# Patient Record
Sex: MALE | Race: ASIAN | NOT HISPANIC OR LATINO
[De-identification: names, ages, dates, MRNs, and addresses within clinical notes are randomized per-mention and may not be internally consistent; named-entity substitution may affect disease eponyms.]

---

## 2024-05-22 ENCOUNTER — APPOINTMENT (OUTPATIENT)
Dept: ORTHOPEDIC SURGERY | Facility: CLINIC | Age: 24
End: 2024-05-22
Payer: COMMERCIAL

## 2024-05-22 VITALS
DIASTOLIC BLOOD PRESSURE: 82 MMHG | WEIGHT: 144 LBS | HEIGHT: 67 IN | HEART RATE: 71 BPM | BODY MASS INDEX: 22.6 KG/M2 | SYSTOLIC BLOOD PRESSURE: 116 MMHG

## 2024-05-22 DIAGNOSIS — Z78.9 OTHER SPECIFIED HEALTH STATUS: ICD-10-CM

## 2024-05-22 DIAGNOSIS — G89.29 PAIN IN LEFT HIP: ICD-10-CM

## 2024-05-22 DIAGNOSIS — M25.552 PAIN IN LEFT HIP: ICD-10-CM

## 2024-05-22 DIAGNOSIS — M16.12 UNILATERAL PRIMARY OSTEOARTHRITIS, LEFT HIP: ICD-10-CM

## 2024-05-22 PROBLEM — Z00.00 ENCOUNTER FOR PREVENTIVE HEALTH EXAMINATION: Status: ACTIVE | Noted: 2024-05-22

## 2024-05-22 PROCEDURE — 99204 OFFICE O/P NEW MOD 45 MIN: CPT

## 2024-05-22 PROCEDURE — 73502 X-RAY EXAM HIP UNI 2-3 VIEWS: CPT | Mod: LT

## 2024-05-22 NOTE — HISTORY OF PRESENT ILLNESS
[de-identified] : VIK VEE  is a 23 year old M who presents with left hip pain.  He says that this has been going on for about 5 years.  He denies any injury or incident to cause it.  He localizes the pain to the anterior hip area and says that he feels it when he walks and when he tries to bend his hip.  He says the pain can radiate down to the knee.  He did see another orthopedic surgeon about 10 months ago in [place] and had a hip injection at that time and did try physical therapy as well.  He says that the injection helped and his hip was doing well until earlier this month when he started to feel the pain again.  He denies any significant medical issues and denies any family history of arthritis.

## 2024-05-22 NOTE — DISCUSSION/SUMMARY
[de-identified] : Discussed findings of today's exam and possible causes of patient's pain.  Educated patient on their diagnosis of bilateral hip osteoarthritis, left worse than right. We discussed that arthritis is a thinning of cartilage and this leads to rough surfaces which over time can lead to pain and inflammation of the hip as it does not move as smoothly as it once did.  Discussed that hip arthritis is typically treated based on the patient's symptoms.  If He is having significant pain, we first start with nonoperative modalities such as anti-inflammatories and physical therapy.  Also discussed injections, but also discussed that there is a risk of worsening of arthritis with hip injections.  Once these have been exhausted, we can consider surgical management, which for most people includes a total hip replacement.  Given his young age and since the injection did work well for him the last time, decision was made to not pursue surgical management at this time.  Reviewed possible courses of nonoperative treatment, and we collaboratively decided best course of treatment at this time will include physical therapy, which will help strengthen the lower leg and take pressure off of the hip.  He was also given a prescription for an imaging guided injection into the left hip.  Because of his young age, I also discussed the possibility of an inflammatory arthritis.  I recommend that he obtain a left hip MRI to evaluate his hip and his SI joint further to evaluate for a secondary cause of arthritis.  In addition, I recommend that he see a rheumatologist for further evaluation of potential inflammatory arthritis.  He will follow-up once the MRI is complete.  I have personally obtained the history, reviewed the ROS as noted, and performed the physical examination today. The patient and I discussed the assessment and options and developed the plan. All questions were answered and the patient stated their understanding of the treatment plan and appreciation of the visit.  My cumulative time spent on this patient's visit included: Preparation for the visit, review of the medical records, review of pertinent diagnostic studies, examination and counseling of the patient on the above diagnosis, treatment plan and prognosis, orders of diagnostic tests, medications and/or appropriate procedures and documentation in the medical records of today's visit.  This note was generated using dragon medical dictation software.  A reasonable effort has been made for proofreading its contents, but typos may still remain.  If there are any questions or points of clarification needed please notify my office.

## 2024-05-22 NOTE — PHYSICAL EXAM
[de-identified] : General: No apparent distress Cardiovascular: extremities warm and well-perfused, no cyanosis Pulmonary: non labored respirations  Musculoskeletal:  Lumbar Spine: Flexion/Extension: Able to flex forward and extend back without significant pain Straight Leg Raise: Negative LE Motor Exam: 5/5 hip flexors/quad/EHL/TA/GS Sensory: SILT throughout all distributions  Left hip:  Gait: Mild Trendelenburg gait Tenderness: No tenderness to palpation about greater trochanter Adductor strength: 5 Abductor strength: 5 Hip Flexion: 70 IR at 90: 0 ER at 90: 10 FADIR: Positive JAKOB: Negative Subspine impingement: Positive  Right hip:  Gait: Negative Tenderness: None Adductor strength: 5 Abductor strength: 5 Hip Flexion: 90 IR at 90: 5 ER at 90: 20 FADIR: Negative JAKOB: Negative Subspine impingement: Negative [de-identified] : 2 views of the left hip obtained today and interpreted by me including AP and lateral views demonstrate no acute fracture or dislocation.  There are moderate to severe degenerative changes about the hip with joint space narrowing and osteophyte formation.  There are also mild degenerative changes about the right hip.  There is possible sclerosis of the SI joints bilaterally  Images reviewed in detail with the patient

## 2024-06-13 ENCOUNTER — APPOINTMENT (OUTPATIENT)
Dept: MRI IMAGING | Facility: CLINIC | Age: 24
End: 2024-06-13
Payer: COMMERCIAL

## 2024-06-13 ENCOUNTER — OUTPATIENT (OUTPATIENT)
Dept: OUTPATIENT SERVICES | Facility: HOSPITAL | Age: 24
LOS: 1 days | End: 2024-06-13
Payer: COMMERCIAL

## 2024-06-13 DIAGNOSIS — M16.12 UNILATERAL PRIMARY OSTEOARTHRITIS, LEFT HIP: ICD-10-CM

## 2024-06-13 PROCEDURE — 73721 MRI JNT OF LWR EXTRE W/O DYE: CPT | Mod: 26,LT

## 2024-06-13 PROCEDURE — 73721 MRI JNT OF LWR EXTRE W/O DYE: CPT

## 2024-06-14 ENCOUNTER — APPOINTMENT (OUTPATIENT)
Dept: RADIOLOGY | Facility: CLINIC | Age: 24
End: 2024-06-14
Payer: COMMERCIAL

## 2024-06-14 ENCOUNTER — OUTPATIENT (OUTPATIENT)
Dept: OUTPATIENT SERVICES | Facility: HOSPITAL | Age: 24
LOS: 1 days | End: 2024-06-14
Payer: COMMERCIAL

## 2024-06-14 DIAGNOSIS — M83.9 ADULT OSTEOMALACIA, UNSPECIFIED: ICD-10-CM

## 2024-06-14 DIAGNOSIS — M79.10 MYALGIA, UNSPECIFIED SITE: ICD-10-CM

## 2024-06-14 DIAGNOSIS — M15.0 PRIMARY GENERALIZED (OSTEO)ARTHRITIS: ICD-10-CM

## 2024-06-14 DIAGNOSIS — M06.4 INFLAMMATORY POLYARTHROPATHY: ICD-10-CM

## 2024-06-14 DIAGNOSIS — M25.50 PAIN IN UNSPECIFIED JOINT: ICD-10-CM

## 2024-06-14 PROCEDURE — 72040 X-RAY EXAM NECK SPINE 2-3 VW: CPT | Mod: 26

## 2024-06-14 PROCEDURE — 72202 X-RAY EXAM SI JOINTS 3/> VWS: CPT | Mod: 26

## 2024-06-14 PROCEDURE — 72070 X-RAY EXAM THORAC SPINE 2VWS: CPT

## 2024-06-14 PROCEDURE — 72100 X-RAY EXAM L-S SPINE 2/3 VWS: CPT | Mod: 26

## 2024-06-14 PROCEDURE — 72040 X-RAY EXAM NECK SPINE 2-3 VW: CPT

## 2024-06-14 PROCEDURE — 72070 X-RAY EXAM THORAC SPINE 2VWS: CPT | Mod: 26

## 2024-06-14 PROCEDURE — 72202 X-RAY EXAM SI JOINTS 3/> VWS: CPT

## 2024-06-14 PROCEDURE — 72100 X-RAY EXAM L-S SPINE 2/3 VWS: CPT

## 2024-06-17 ENCOUNTER — TRANSCRIPTION ENCOUNTER (OUTPATIENT)
Age: 24
End: 2024-06-17

## 2024-06-18 ENCOUNTER — NON-APPOINTMENT (OUTPATIENT)
Age: 24
End: 2024-06-18

## 2024-06-25 ENCOUNTER — APPOINTMENT (OUTPATIENT)
Dept: ORTHOPEDIC SURGERY | Facility: CLINIC | Age: 24
End: 2024-06-25
Payer: COMMERCIAL

## 2024-06-25 VITALS
SYSTOLIC BLOOD PRESSURE: 113 MMHG | HEART RATE: 63 BPM | DIASTOLIC BLOOD PRESSURE: 71 MMHG | BODY MASS INDEX: 22.6 KG/M2 | HEIGHT: 67 IN | WEIGHT: 144 LBS

## 2024-06-25 PROCEDURE — 99215 OFFICE O/P EST HI 40 MIN: CPT

## 2024-06-25 PROCEDURE — 73502 X-RAY EXAM HIP UNI 2-3 VIEWS: CPT

## 2024-06-25 PROCEDURE — 72120 X-RAY BEND ONLY L-S SPINE: CPT

## 2024-07-09 ENCOUNTER — NON-APPOINTMENT (OUTPATIENT)
Age: 24
End: 2024-07-09

## 2024-07-09 ASSESSMENT — HOOS JR
BENDING TO THE FLOOR TO PICK UP OBJECT: MODERATE
WALKING ON UNEVEN SURFACE: MILD
GOING UP OR DOWN STAIRS: MILD
RISING FROM SITTING: MODERATE
HOOS JR RAW SCORE: 6

## 2024-07-31 ENCOUNTER — OUTPATIENT (OUTPATIENT)
Dept: OUTPATIENT SERVICES | Facility: HOSPITAL | Age: 24
LOS: 1 days | End: 2024-07-31
Payer: COMMERCIAL

## 2024-07-31 VITALS
TEMPERATURE: 98 F | WEIGHT: 151.02 LBS | DIASTOLIC BLOOD PRESSURE: 73 MMHG | HEART RATE: 66 BPM | OXYGEN SATURATION: 98 % | HEIGHT: 66 IN | SYSTOLIC BLOOD PRESSURE: 105 MMHG | RESPIRATION RATE: 18 BRPM

## 2024-07-31 DIAGNOSIS — Z01.818 ENCOUNTER FOR OTHER PREPROCEDURAL EXAMINATION: ICD-10-CM

## 2024-07-31 DIAGNOSIS — M16.12 UNILATERAL PRIMARY OSTEOARTHRITIS, LEFT HIP: ICD-10-CM

## 2024-07-31 LAB
A1C WITH ESTIMATED AVERAGE GLUCOSE RESULT: 5.4 % — SIGNIFICANT CHANGE UP (ref 4–5.6)
ALBUMIN SERPL ELPH-MCNC: 3.9 G/DL — SIGNIFICANT CHANGE UP (ref 3.3–5)
ALP SERPL-CCNC: 130 U/L — HIGH (ref 30–120)
ALT FLD-CCNC: 40 U/L — SIGNIFICANT CHANGE UP (ref 10–60)
ANION GAP SERPL CALC-SCNC: 6 MMOL/L — SIGNIFICANT CHANGE UP (ref 5–17)
APTT BLD: 32.2 SEC — SIGNIFICANT CHANGE UP (ref 24.5–35.6)
AST SERPL-CCNC: 22 U/L — SIGNIFICANT CHANGE UP (ref 10–40)
BILIRUB SERPL-MCNC: 0.5 MG/DL — SIGNIFICANT CHANGE UP (ref 0.2–1.2)
BLD GP AB SCN SERPL QL: SIGNIFICANT CHANGE UP
BUN SERPL-MCNC: 10 MG/DL — SIGNIFICANT CHANGE UP (ref 7–23)
CALCIUM SERPL-MCNC: 9.6 MG/DL — SIGNIFICANT CHANGE UP (ref 8.4–10.5)
CHLORIDE SERPL-SCNC: 100 MMOL/L — SIGNIFICANT CHANGE UP (ref 96–108)
CO2 SERPL-SCNC: 30 MMOL/L — SIGNIFICANT CHANGE UP (ref 22–31)
CREAT SERPL-MCNC: 0.82 MG/DL — SIGNIFICANT CHANGE UP (ref 0.5–1.3)
EGFR: 125 ML/MIN/1.73M2 — SIGNIFICANT CHANGE UP
ESTIMATED AVERAGE GLUCOSE: 108 MG/DL — SIGNIFICANT CHANGE UP (ref 68–114)
GLUCOSE SERPL-MCNC: 90 MG/DL — SIGNIFICANT CHANGE UP (ref 70–99)
HCT VFR BLD CALC: 44.5 % — SIGNIFICANT CHANGE UP (ref 39–50)
HGB BLD-MCNC: 15.1 G/DL — SIGNIFICANT CHANGE UP (ref 13–17)
INR BLD: 1.23 RATIO — HIGH (ref 0.85–1.18)
MCHC RBC-ENTMCNC: 28.5 PG — SIGNIFICANT CHANGE UP (ref 27–34)
MCHC RBC-ENTMCNC: 33.9 GM/DL — SIGNIFICANT CHANGE UP (ref 32–36)
MCV RBC AUTO: 84 FL — SIGNIFICANT CHANGE UP (ref 80–100)
MRSA PCR RESULT.: SIGNIFICANT CHANGE UP
NRBC # BLD: 0 /100 WBCS — SIGNIFICANT CHANGE UP (ref 0–0)
PLATELET # BLD AUTO: 284 K/UL — SIGNIFICANT CHANGE UP (ref 150–400)
POTASSIUM SERPL-MCNC: 4.3 MMOL/L — SIGNIFICANT CHANGE UP (ref 3.5–5.3)
POTASSIUM SERPL-SCNC: 4.3 MMOL/L — SIGNIFICANT CHANGE UP (ref 3.5–5.3)
PROT SERPL-MCNC: 9.1 G/DL — HIGH (ref 6–8.3)
PROTHROM AB SERPL-ACNC: 13.3 SEC — HIGH (ref 9.5–13)
RBC # BLD: 5.3 M/UL — SIGNIFICANT CHANGE UP (ref 4.2–5.8)
RBC # FLD: 11.9 % — SIGNIFICANT CHANGE UP (ref 10.3–14.5)
S AUREUS DNA NOSE QL NAA+PROBE: SIGNIFICANT CHANGE UP
SODIUM SERPL-SCNC: 136 MMOL/L — SIGNIFICANT CHANGE UP (ref 135–145)
WBC # BLD: 10.73 K/UL — HIGH (ref 3.8–10.5)
WBC # FLD AUTO: 10.73 K/UL — HIGH (ref 3.8–10.5)

## 2024-07-31 PROCEDURE — 85610 PROTHROMBIN TIME: CPT

## 2024-07-31 PROCEDURE — 86900 BLOOD TYPING SEROLOGIC ABO: CPT

## 2024-07-31 PROCEDURE — 83036 HEMOGLOBIN GLYCOSYLATED A1C: CPT

## 2024-07-31 PROCEDURE — 86901 BLOOD TYPING SEROLOGIC RH(D): CPT

## 2024-07-31 PROCEDURE — 85730 THROMBOPLASTIN TIME PARTIAL: CPT

## 2024-07-31 PROCEDURE — G0463: CPT

## 2024-07-31 PROCEDURE — 80053 COMPREHEN METABOLIC PANEL: CPT

## 2024-07-31 PROCEDURE — 85027 COMPLETE CBC AUTOMATED: CPT

## 2024-07-31 PROCEDURE — 87640 STAPH A DNA AMP PROBE: CPT

## 2024-07-31 PROCEDURE — 86850 RBC ANTIBODY SCREEN: CPT

## 2024-07-31 PROCEDURE — 87641 MR-STAPH DNA AMP PROBE: CPT

## 2024-07-31 PROCEDURE — 93005 ELECTROCARDIOGRAM TRACING: CPT

## 2024-07-31 PROCEDURE — 36415 COLL VENOUS BLD VENIPUNCTURE: CPT

## 2024-07-31 PROCEDURE — 93010 ELECTROCARDIOGRAM REPORT: CPT

## 2024-07-31 NOTE — H&P PST ADULT - ATTENDING COMMENTS
The discussion regarding options for nonoperative and operative management was introduced through a patient shared decision making protocol. The benefits of surgery versus the risks were discussed with the patient and family.  Risks of surgery include medical complications of anesthesia, DVT, pulmonary embolism, heart attack, stroke, death, hardware complications, need for revision surgery, infection, bleeding, need for transfusion, neurovascular injury including thigh numbness, dislocation, fracture, chronic pain, stiffness and scarring including heterotopic ossification, and limb length discrepancy requiring a shoe lift were addressed with the patient. They appeared to understand the ramifications of surgery and had ample time for questions, then consenting for a left total hip replacement.

## 2024-07-31 NOTE — H&P PST ADULT - HISTORY OF PRESENT ILLNESS
23 year-old male presents  with a complaint of left hip pain. Patient describes onset over the last number of years progressively getting worse. Pain is typically 5/10 in severity, sharp pain  with certain activities. Symptoms are exacerbated by activity, or even walking/standing. Pain  improved with rest and modifications of daily routines. Patient denies any radiation of symptoms. Patient is scheduled for Left total anterior Hip replacement on 08/19/2024.

## 2024-07-31 NOTE — H&P PST ADULT - PROBLEM SELECTOR PLAN 1
22 y/o male with left hip  pain  scheduled surgery: Left total anterior hip replacement  will obtain medical clearance  Patient provided with pre-operative instructions and verbalized understanding.     Patient will be NPO on day of surgery.   Patient will stop NSAIDs, aspirin, herbal supplements or vitamins 1 week prior to surgery.    Chlorhexidine wash provided with instructions.

## 2024-07-31 NOTE — H&P PST ADULT - NSICDXPROCEDURE_GEN_ALL_CORE_FT
PROCEDURES:  Total replacement of left hip joint by anterior approach 31-Jul-2024 11:58:51  Lavinia Zelaya

## 2024-08-01 LAB
INR PPP: 1.05 RATIO
PT BLD: 11.9 SEC

## 2024-08-05 ENCOUNTER — NON-APPOINTMENT (OUTPATIENT)
Age: 24
End: 2024-08-05

## 2024-08-14 PROBLEM — M45.9 ANKYLOSING SPONDYLITIS OF UNSPECIFIED SITES IN SPINE: Chronic | Status: ACTIVE | Noted: 2024-07-31

## 2024-08-14 PROBLEM — M16.12 UNILATERAL PRIMARY OSTEOARTHRITIS, LEFT HIP: Chronic | Status: ACTIVE | Noted: 2024-07-31

## 2024-08-19 ENCOUNTER — OUTPATIENT (OUTPATIENT)
Dept: OUTPATIENT SERVICES | Facility: HOSPITAL | Age: 24
LOS: 1 days | End: 2024-08-19
Payer: COMMERCIAL

## 2024-08-19 ENCOUNTER — APPOINTMENT (OUTPATIENT)
Dept: ORTHOPEDIC SURGERY | Facility: HOSPITAL | Age: 24
End: 2024-08-19

## 2024-08-19 VITALS
HEART RATE: 82 BPM | RESPIRATION RATE: 13 BRPM | SYSTOLIC BLOOD PRESSURE: 133 MMHG | HEIGHT: 66 IN | OXYGEN SATURATION: 100 % | WEIGHT: 147.27 LBS | TEMPERATURE: 98 F | DIASTOLIC BLOOD PRESSURE: 85 MMHG

## 2024-08-19 DIAGNOSIS — M16.12 UNILATERAL PRIMARY OSTEOARTHRITIS, LEFT HIP: ICD-10-CM

## 2024-08-19 LAB — ABO RH CONFIRMATION: SIGNIFICANT CHANGE UP

## 2024-08-19 PROCEDURE — 97607 NEG PRS WND THR NDME<=50SQCM: CPT

## 2024-08-19 PROCEDURE — 99222 1ST HOSP IP/OBS MODERATE 55: CPT

## 2024-08-19 PROCEDURE — 27130 TOTAL HIP ARTHROPLASTY: CPT | Mod: LT

## 2024-08-19 DEVICE — PIN STEINMAN TROC 3/16X9IN STRL: Type: IMPLANTABLE DEVICE | Site: LEFT HIP | Status: FUNCTIONAL

## 2024-08-19 DEVICE — BONE WAX 2.5GM: Type: IMPLANTABLE DEVICE | Site: LEFT HIP | Status: FUNCTIONAL

## 2024-08-19 DEVICE — SCREW BONE 30MM CANCELLOUS: Type: IMPLANTABLE DEVICE | Site: LEFT HIP | Status: FUNCTIONAL

## 2024-08-19 DEVICE — STEM FEM ACTIS TAPR STD COLLAR SZ 4: Type: IMPLANTABLE DEVICE | Site: LEFT HIP | Status: FUNCTIONAL

## 2024-08-19 DEVICE — SHELL ACET EMPHASYS 3H 50MM: Type: IMPLANTABLE DEVICE | Site: LEFT HIP | Status: FUNCTIONAL

## 2024-08-19 DEVICE — HEAD FEM CEM TAPR PLUS 1.5MM 12/14X36MM: Type: IMPLANTABLE DEVICE | Site: LEFT HIP | Status: FUNCTIONAL

## 2024-08-19 DEVICE — LINER ACET EMPHASYS AOX NEUTRAL 50X36MM: Type: IMPLANTABLE DEVICE | Site: LEFT HIP | Status: FUNCTIONAL

## 2024-08-19 RX ORDER — CEFAZOLIN SODIUM 2 G/100ML
2000 INJECTION, SOLUTION INTRAVENOUS EVERY 8 HOURS
Refills: 0 | Status: COMPLETED | OUTPATIENT
Start: 2024-08-19 | End: 2024-08-20

## 2024-08-19 RX ORDER — ONDANSETRON 2 MG/ML
4 INJECTION, SOLUTION INTRAMUSCULAR; INTRAVENOUS ONCE
Refills: 0 | Status: DISCONTINUED | OUTPATIENT
Start: 2024-08-19 | End: 2024-08-19

## 2024-08-19 RX ORDER — ACETAMINOPHEN 325 MG/1
1000 TABLET ORAL EVERY 6 HOURS
Refills: 0 | Status: COMPLETED | OUTPATIENT
Start: 2024-08-19 | End: 2024-08-20

## 2024-08-19 RX ORDER — CELECOXIB 400 MG/1
200 CAPSULE ORAL EVERY 12 HOURS
Refills: 0 | Status: DISCONTINUED | OUTPATIENT
Start: 2024-08-19 | End: 2024-09-02

## 2024-08-19 RX ORDER — APREPITANT 40 MG/1
40 CAPSULE ORAL ONCE
Refills: 0 | Status: COMPLETED | OUTPATIENT
Start: 2024-08-19 | End: 2024-08-19

## 2024-08-19 RX ORDER — OXYCODONE HYDROCHLORIDE 5 MG/1
10 TABLET ORAL
Refills: 0 | Status: DISCONTINUED | OUTPATIENT
Start: 2024-08-19 | End: 2024-08-19

## 2024-08-19 RX ORDER — HYDROMORPHONE HYDROCHLORIDE 2 MG/1
0.5 TABLET ORAL
Refills: 0 | Status: DISCONTINUED | OUTPATIENT
Start: 2024-08-19 | End: 2024-08-19

## 2024-08-19 RX ORDER — POLYETHYLENE GLYCOL 3350 17 G/17G
17 POWDER, FOR SOLUTION ORAL AT BEDTIME
Refills: 0 | Status: DISCONTINUED | OUTPATIENT
Start: 2024-08-19 | End: 2024-09-02

## 2024-08-19 RX ORDER — SENNA 187 MG
2 TABLET ORAL AT BEDTIME
Refills: 0 | Status: DISCONTINUED | OUTPATIENT
Start: 2024-08-19 | End: 2024-09-02

## 2024-08-19 RX ORDER — ACETAMINOPHEN 325 MG/1
1000 TABLET ORAL ONCE
Refills: 0 | Status: COMPLETED | OUTPATIENT
Start: 2024-08-19 | End: 2024-08-19

## 2024-08-19 RX ORDER — OXYCODONE HYDROCHLORIDE 5 MG/1
5 TABLET ORAL
Refills: 0 | Status: DISCONTINUED | OUTPATIENT
Start: 2024-08-19 | End: 2024-08-20

## 2024-08-19 RX ORDER — SODIUM CHLORIDE 9 MG/ML
500 INJECTION INTRAMUSCULAR; INTRAVENOUS; SUBCUTANEOUS ONCE
Refills: 0 | Status: COMPLETED | OUTPATIENT
Start: 2024-08-19 | End: 2024-08-19

## 2024-08-19 RX ORDER — ACETAMINOPHEN 325 MG/1
1000 TABLET ORAL EVERY 8 HOURS
Refills: 0 | Status: DISCONTINUED | OUTPATIENT
Start: 2024-08-20 | End: 2024-09-02

## 2024-08-19 RX ORDER — NAPROXEN 250 MG
1 TABLET ORAL
Refills: 0 | DISCHARGE

## 2024-08-19 RX ORDER — ASPIRIN 81 MG
81 TABLET, DELAYED RELEASE (ENTERIC COATED) ORAL EVERY 12 HOURS
Refills: 0 | Status: DISCONTINUED | OUTPATIENT
Start: 2024-08-20 | End: 2024-09-02

## 2024-08-19 RX ORDER — CEFAZOLIN SODIUM 2 G/100ML
2000 INJECTION, SOLUTION INTRAVENOUS ONCE
Refills: 0 | Status: COMPLETED | OUTPATIENT
Start: 2024-08-19 | End: 2024-08-19

## 2024-08-19 RX ORDER — CHLORHEXIDINE GLUCONATE 40 MG/ML
1 SOLUTION TOPICAL ONCE
Refills: 0 | Status: COMPLETED | OUTPATIENT
Start: 2024-08-19 | End: 2024-08-19

## 2024-08-19 RX ORDER — HYDROMORPHONE HYDROCHLORIDE 2 MG/1
1 TABLET ORAL
Refills: 0 | Status: DISCONTINUED | OUTPATIENT
Start: 2024-08-19 | End: 2024-08-19

## 2024-08-19 RX ORDER — PANTOPRAZOLE SODIUM 40 MG
40 TABLET, DELAYED RELEASE (ENTERIC COATED) ORAL
Refills: 0 | Status: DISCONTINUED | OUTPATIENT
Start: 2024-08-19 | End: 2024-09-02

## 2024-08-19 RX ORDER — ONDANSETRON 2 MG/ML
4 INJECTION, SOLUTION INTRAMUSCULAR; INTRAVENOUS EVERY 6 HOURS
Refills: 0 | Status: DISCONTINUED | OUTPATIENT
Start: 2024-08-19 | End: 2024-09-02

## 2024-08-19 RX ORDER — DEXAMETHASONE 0.75 MG
8 TABLET ORAL ONCE
Refills: 0 | Status: COMPLETED | OUTPATIENT
Start: 2024-08-20 | End: 2024-08-20

## 2024-08-19 RX ADMIN — ACETAMINOPHEN 400 MILLIGRAM(S): 325 TABLET ORAL at 18:54

## 2024-08-19 RX ADMIN — SODIUM CHLORIDE 1000 MILLILITER(S): 9 INJECTION INTRAMUSCULAR; INTRAVENOUS; SUBCUTANEOUS at 14:40

## 2024-08-19 RX ADMIN — OXYCODONE HYDROCHLORIDE 10 MILLIGRAM(S): 5 TABLET ORAL at 16:50

## 2024-08-19 RX ADMIN — SODIUM CHLORIDE 1000 MILLILITER(S): 9 INJECTION INTRAMUSCULAR; INTRAVENOUS; SUBCUTANEOUS at 18:00

## 2024-08-19 RX ADMIN — CEFAZOLIN SODIUM 100 MILLIGRAM(S): 2 INJECTION, SOLUTION INTRAVENOUS at 18:54

## 2024-08-19 RX ADMIN — HYDROMORPHONE HYDROCHLORIDE 0.5 MILLIGRAM(S): 2 TABLET ORAL at 14:50

## 2024-08-19 RX ADMIN — CHLORHEXIDINE GLUCONATE 1 APPLICATION(S): 40 SOLUTION TOPICAL at 09:58

## 2024-08-19 RX ADMIN — Medication 75 MILLILITER(S): at 14:28

## 2024-08-19 RX ADMIN — CELECOXIB 200 MILLIGRAM(S): 400 CAPSULE ORAL at 22:22

## 2024-08-19 RX ADMIN — ACETAMINOPHEN 1000 MILLIGRAM(S): 325 TABLET ORAL at 20:24

## 2024-08-19 RX ADMIN — HYDROMORPHONE HYDROCHLORIDE 0.5 MILLIGRAM(S): 2 TABLET ORAL at 14:40

## 2024-08-19 RX ADMIN — APREPITANT 40 MILLIGRAM(S): 40 CAPSULE ORAL at 09:57

## 2024-08-19 RX ADMIN — OXYCODONE HYDROCHLORIDE 10 MILLIGRAM(S): 5 TABLET ORAL at 15:52

## 2024-08-19 RX ADMIN — CELECOXIB 200 MILLIGRAM(S): 400 CAPSULE ORAL at 22:00

## 2024-08-19 NOTE — ASU PATIENT PROFILE, ADULT - CAREGIVER PHONE NUMBER
Called to schedule iron infusions, no one picked up. Left voice mail to call me back    
[phone]

## 2024-08-19 NOTE — PHYSICAL THERAPY INITIAL EVALUATION ADULT - ADDITIONAL COMMENTS
Pt resides in pvt home with parents, available to assist upon d/c. Pt states 4STE +2HR, resides on first floor. Pt has RW, commode. Pt has tub shower. Pt reports was independent prior to admission.

## 2024-08-19 NOTE — PHYSICAL THERAPY INITIAL EVALUATION ADULT - MANUAL MUSCLE TESTING RESULTS, REHAB EVAL
generalized weakness secondary to surgery L LE 3-/5; B UE R LE grossly >=4/5/grossly assessed due to

## 2024-08-19 NOTE — CONSULT NOTE ADULT - ASSESSMENT
23 year-old male presents  with a complaint of left hip pain, s/p total left hip replacement day 0        S/p left total hip replacement day 0  pt/ot  pain control  dvt prophalxsis  d/c planning per ortho/sw       time spent 35 minutes

## 2024-08-19 NOTE — PHYSICAL THERAPY INITIAL EVALUATION ADULT - STANDING BALANCE: DYNAMIC, REHAB EVAL
HEARING AID FITTING    07/14/23 AUDIOLOGY  Date of Purchase:    07/26/2023  Make :  Phonak  Model:  Audeo L50 R   Serial # RT:  [id number]  Serial # LT:  [id number]  Tubing:  None   RT:  Phonak M power size 3   LT:  Phonak M power size 3  Dome/Earmold RT: Large Tulip  Dome/Earmold LT: Large Tulip  Retention line:  Yes  Battery Size:  Internal Lithium Ion  Supplies:  [employer] disk  Accessories:   SN: [id number]  :  [employer]  Cell Phone:    Ana installed:    Paired to Cell Phone:   [employer] Service Plan: 2 years  Manuf. Warranty: 10/10/2025  L&D Warranty: 10/10/2025  Payor:   10/10/2025  Audiologist:  Karen Jorge    PRE-FITTING CHECKLIST:  Hearing evaluation performed within past 6 months  Previous hearing aid user.  He returns the [employer] hearing aids today at the end of trial.    FITTING CHECKLIST:  Patient shown parts of hearing aid(s  Review of battery charging and safety. Battery size: Lithium Ion.  Assistance needed: No  Insertion and removal of hearing aid. Assistance needed: No  Cleaning/maintenance/troubleshooting of hearing aid. Assistance needed: No  Wax guard system: Yes Assistance needed: No  Paired to cell phone: No. Cell phone ana installed: No. Audio streaming enabled: No.  Accessories provided:     PROGRAMMING:  Software used:[employer]   used:[employer]  Initial settings: 100% adaptation  Program destinations:   1: autosense     SUMMARY:   Melvin reports sound quality is satisfactory, speech is clear, aids are comfortable    FOLLOW-UP:  4 weeks. Real ear and phone pairing at that appt.   with RW/good minus

## 2024-08-19 NOTE — OCCUPATIONAL THERAPY INITIAL EVALUATION ADULT - LIVES WITH, PROFILE
Pt lives with his parents in a private home, 4 steps to enter, all needs met on the 1st floor with a tub. Pt was independent with ADLs, IADLs, functional mobility/transfers prior to admission without AD./parents

## 2024-08-19 NOTE — CONSULT NOTE ADULT - SUBJECTIVE AND OBJECTIVE BOX
23 year-old male presents  with a complaint of left hip pain, s/p total left hip replacement day 0        Allergies/Medications:   	No Known Allergies:      Past Medical, Past Surgical, and Family History:  PAST MEDICAL HISTORY:  Ankylosing spondylitis     Osteoarthritis of left hip.     PAST SURGICAL HISTORY:  No significant past surgical history.     FAMILY HISTORY:  Mother  Still living? Yes, Estimated age: Age Unknown  FH: type 2 diabetes, Age at diagnosis: Age Unknown.    Home Medications:   * Patient Currently Takes Medications as of 31-Jul-2024 11:26 documented in Structured Notes  · 	Aleve 220 mg oral capsule: Last Dose Taken:  , 1 cap(s) orally    Review of Systems:   · Negative General Symptoms	no fever; no chills; no sweating  · Negative Skin Symptoms	no rash; no itching  · Negative Ophthalmologic Symptoms	no lacrimation R; no blurred vision L; no blurred vision R  · Negative ENMT Symptoms	no hearing difficulty; no tinnitus; no vertigo; no sinus symptoms; no nasal congestion  · Negative Respiratory and Thorax Symptoms	no wheezing; no dyspnea; no cough  · Negative Cardiovascular Symptoms	no chest pain; no palpitations; no dyspnea on exertion  · Negative Gastrointestinal Symptoms	no nausea; no vomiting; no diarrhea  · Genitourinary	negative  · Musculoskeletal Symptoms	arthritis; joint pain; left hip  · Negative Neurological Symptoms	no transient paralysis; no weakness; no paresthesias; no generalized seizures; no focal seizures  · Psychiatric	negative      Physical Exam:    Physical Exam:  · Constitutional	well-groomed  · Eyes	PERRL; EOMI  · Respiratory	clear to auscultation bilaterally; no wheezes  · Cardiovascular	regular rate and rhythm; S1 S2 present  · Gastrointestinal	soft; nontender; nondistended  · Neurological	sensation intact; responds to pain; responds to verbal commands  · Skin	warm and dry; color normal  · Musculoskeletal	decreased ROM; decreased ROM due to pain; left hip  · Psychiatric	alert and oriented x3; normal behavior

## 2024-08-19 NOTE — BRIEF OPERATIVE NOTE - NSICDXBRIEFPROCEDURE_GEN_ALL_CORE_FT
PROCEDURES:  Total replacement of left hip joint using anterior supine intermuscular technique 19-Aug-2024 13:54:11  Jose Rosales

## 2024-08-19 NOTE — OCCUPATIONAL THERAPY INITIAL EVALUATION ADULT - MANUAL MUSCLE TESTING RESULTS, REHAB EVAL
b/l UE and RLE at least 3+/5, L hip not tested 2*surgery, L ankle at least 3+/5/grossly assessed due to

## 2024-08-19 NOTE — PHYSICAL THERAPY INITIAL EVALUATION ADULT - PRECAUTIONS/LIMITATIONS, REHAB EVAL
S/p CABG on 9/27  Doing well  Remains confused at night   On RA   No complaints   Awaiting rehab eval   Continue supportive care fall precautions

## 2024-08-20 ENCOUNTER — TRANSCRIPTION ENCOUNTER (OUTPATIENT)
Age: 24
End: 2024-08-20

## 2024-08-20 VITALS — SYSTOLIC BLOOD PRESSURE: 102 MMHG | DIASTOLIC BLOOD PRESSURE: 65 MMHG

## 2024-08-20 LAB
ANION GAP SERPL CALC-SCNC: 6 MMOL/L — SIGNIFICANT CHANGE UP (ref 5–17)
BUN SERPL-MCNC: 8 MG/DL — SIGNIFICANT CHANGE UP (ref 7–23)
CALCIUM SERPL-MCNC: 8.3 MG/DL — LOW (ref 8.4–10.5)
CHLORIDE SERPL-SCNC: 108 MMOL/L — SIGNIFICANT CHANGE UP (ref 96–108)
CO2 SERPL-SCNC: 29 MMOL/L — SIGNIFICANT CHANGE UP (ref 22–31)
CREAT SERPL-MCNC: 0.78 MG/DL — SIGNIFICANT CHANGE UP (ref 0.5–1.3)
EGFR: 127 ML/MIN/1.73M2 — SIGNIFICANT CHANGE UP
GLUCOSE SERPL-MCNC: 98 MG/DL — SIGNIFICANT CHANGE UP (ref 70–99)
HCT VFR BLD CALC: 33.5 % — LOW (ref 39–50)
HGB BLD-MCNC: 11.3 G/DL — LOW (ref 13–17)
MCHC RBC-ENTMCNC: 28.8 PG — SIGNIFICANT CHANGE UP (ref 27–34)
MCHC RBC-ENTMCNC: 33.7 GM/DL — SIGNIFICANT CHANGE UP (ref 32–36)
MCV RBC AUTO: 85.5 FL — SIGNIFICANT CHANGE UP (ref 80–100)
NRBC # BLD: 0 /100 WBCS — SIGNIFICANT CHANGE UP (ref 0–0)
PLATELET # BLD AUTO: 207 K/UL — SIGNIFICANT CHANGE UP (ref 150–400)
POTASSIUM SERPL-MCNC: 3.9 MMOL/L — SIGNIFICANT CHANGE UP (ref 3.5–5.3)
POTASSIUM SERPL-SCNC: 3.9 MMOL/L — SIGNIFICANT CHANGE UP (ref 3.5–5.3)
RBC # BLD: 3.92 M/UL — LOW (ref 4.2–5.8)
RBC # FLD: 12.1 % — SIGNIFICANT CHANGE UP (ref 10.3–14.5)
SODIUM SERPL-SCNC: 143 MMOL/L — SIGNIFICANT CHANGE UP (ref 135–145)
WBC # BLD: 13.98 K/UL — HIGH (ref 3.8–10.5)
WBC # FLD AUTO: 13.98 K/UL — HIGH (ref 3.8–10.5)

## 2024-08-20 PROCEDURE — 97116 GAIT TRAINING THERAPY: CPT

## 2024-08-20 PROCEDURE — C1889: CPT

## 2024-08-20 PROCEDURE — 97535 SELF CARE MNGMENT TRAINING: CPT

## 2024-08-20 PROCEDURE — 85027 COMPLETE CBC AUTOMATED: CPT

## 2024-08-20 PROCEDURE — C1713: CPT

## 2024-08-20 PROCEDURE — C1776: CPT

## 2024-08-20 PROCEDURE — 80048 BASIC METABOLIC PNL TOTAL CA: CPT

## 2024-08-20 PROCEDURE — 99231 SBSQ HOSP IP/OBS SF/LOW 25: CPT

## 2024-08-20 PROCEDURE — 27130 TOTAL HIP ARTHROPLASTY: CPT | Mod: LT

## 2024-08-20 PROCEDURE — 97165 OT EVAL LOW COMPLEX 30 MIN: CPT

## 2024-08-20 PROCEDURE — 97530 THERAPEUTIC ACTIVITIES: CPT

## 2024-08-20 PROCEDURE — 36415 COLL VENOUS BLD VENIPUNCTURE: CPT

## 2024-08-20 PROCEDURE — 76000 FLUOROSCOPY <1 HR PHYS/QHP: CPT

## 2024-08-20 PROCEDURE — 97161 PT EVAL LOW COMPLEX 20 MIN: CPT

## 2024-08-20 RX ORDER — ACETAMINOPHEN 325 MG/1
2 TABLET ORAL
Qty: 0 | Refills: 0 | DISCHARGE
Start: 2024-08-20

## 2024-08-20 RX ORDER — ASPIRIN 81 MG
1 TABLET, DELAYED RELEASE (ENTERIC COATED) ORAL
Qty: 55 | Refills: 0
Start: 2024-08-20

## 2024-08-20 RX ORDER — POLYETHYLENE GLYCOL 3350 17 G/17G
17 POWDER, FOR SOLUTION ORAL
Qty: 0 | Refills: 0 | DISCHARGE
Start: 2024-08-20

## 2024-08-20 RX ORDER — CELECOXIB 400 MG/1
1 CAPSULE ORAL
Qty: 60 | Refills: 0
Start: 2024-08-20

## 2024-08-20 RX ORDER — OMEPRAZOLE 40 MG/1
1 CAPSULE, DELAYED RELEASE ORAL
Qty: 30 | Refills: 0
Start: 2024-08-20

## 2024-08-20 RX ORDER — OXYCODONE HYDROCHLORIDE 5 MG/1
1 TABLET ORAL
Qty: 42 | Refills: 0
Start: 2024-08-20

## 2024-08-20 RX ORDER — SENNA 187 MG
2 TABLET ORAL
Qty: 0 | Refills: 0 | DISCHARGE
Start: 2024-08-20

## 2024-08-20 RX ADMIN — ACETAMINOPHEN 400 MILLIGRAM(S): 325 TABLET ORAL at 00:49

## 2024-08-20 RX ADMIN — Medication 101.6 MILLIGRAM(S): at 06:22

## 2024-08-20 RX ADMIN — ACETAMINOPHEN 1000 MILLIGRAM(S): 325 TABLET ORAL at 00:58

## 2024-08-20 RX ADMIN — ACETAMINOPHEN 1000 MILLIGRAM(S): 325 TABLET ORAL at 06:22

## 2024-08-20 RX ADMIN — ACETAMINOPHEN 1000 MILLIGRAM(S): 325 TABLET ORAL at 07:08

## 2024-08-20 RX ADMIN — Medication 81 MILLIGRAM(S): at 06:22

## 2024-08-20 RX ADMIN — CELECOXIB 200 MILLIGRAM(S): 400 CAPSULE ORAL at 08:23

## 2024-08-20 RX ADMIN — OXYCODONE HYDROCHLORIDE 5 MILLIGRAM(S): 5 TABLET ORAL at 08:23

## 2024-08-20 RX ADMIN — OXYCODONE HYDROCHLORIDE 5 MILLIGRAM(S): 5 TABLET ORAL at 09:15

## 2024-08-20 RX ADMIN — ONDANSETRON 4 MILLIGRAM(S): 2 INJECTION, SOLUTION INTRAMUSCULAR; INTRAVENOUS at 07:53

## 2024-08-20 RX ADMIN — CEFAZOLIN SODIUM 100 MILLIGRAM(S): 2 INJECTION, SOLUTION INTRAVENOUS at 04:26

## 2024-08-20 RX ADMIN — CELECOXIB 200 MILLIGRAM(S): 400 CAPSULE ORAL at 08:26

## 2024-08-20 RX ADMIN — Medication 40 MILLIGRAM(S): at 06:23

## 2024-08-20 NOTE — DISCHARGE NOTE NURSING/CASE MANAGEMENT/SOCIAL WORK - PATIENT PORTAL LINK FT
You can access the FollowMyHealth Patient Portal offered by [facility] by registering at the following website: [email or web address]. By joining [email or web address]’s FollowMyHealth portal, you will also be able to view your health information using other applications (apps) compatible with our system.

## 2024-08-20 NOTE — PHARMACOTHERAPY INTERVENTION NOTE - COMMENTS
none  Admission medication reconciliation POD1  
Transition of Care video discharge education - medication calendar given to patient  Will Burden, PharmD Candidate for Monika Grijalva, PharmD

## 2024-08-20 NOTE — CARE COORDINATION ASSESSMENT. - NSPASTMEDSURGHISTORY_GEN_ALL_CORE_FT
PAST MEDICAL & SURGICAL HISTORY:  Ankylosing spondylitis      Osteoarthritis of left hip      No significant past surgical history

## 2024-08-20 NOTE — DISCHARGE NOTE PROVIDER - ATTENDING DISCHARGE PHYSICAL EXAMINATION:
PHYSICAL EXAM:  Vital Signs Last 24 Hrs  T(C): 36.4 (20 Aug 2024 08:14), Max: 36.7 (19 Aug 2024 15:30)  T(F): 97.6 (20 Aug 2024 08:14), Max: 98 (19 Aug 2024 15:30)  HR: 86 (20 Aug 2024 08:14) (66 - 99)  BP: 102/65 (20 Aug 2024 10:00) (91/49 - 110/66)  BP(mean): --  RR: 18 (20 Aug 2024 08:14) (12 - 18)  SpO2: 100% (20 Aug 2024 08:14) (98% - 100%)    Parameters below as of 20 Aug 2024 03:16  Patient On (Oxygen Delivery Method): room air        GENERAL: NAD  HEAD:  Atraumatic, Normocephalic  ENMT: Logan Mucous Membranes  NECK: Supple, No JVD, Normal thyroid  HEART: Regular rate and rhythm; No murmurs, rubs, or gallops  CHEST/LUNG: Clear to auscultation bilaterally; No rales, rhonchi, wheezing, or rubs  ABDOMEN: Soft, Nontender, Nondistended; Bowel sounds present  EXTREMITIES:  2+ Peripheral Pulses, No clubbing, cyanosis, or edema  SKIN: No rashes or lesions

## 2024-08-20 NOTE — DISCHARGE NOTE PROVIDER - CARE PROVIDER_API CALL
Otis Correa  Orthopaedic Surgery  [address], [address]  [place] [address]  Phone: [phone]  Fax: [phone]  Follow Up Time: 2 weeks

## 2024-08-20 NOTE — PROGRESS NOTE ADULT - SUBJECTIVE AND OBJECTIVE BOX
Orthopedic Progress Note     Interval History:  No acute events overnight, pain is well controlled.  Patient denies any chest pain, SOB, N/V, fevers/chills.    Exam:  T(C): 36.4 (08-20-24 @ 08:14), Max: 36.7 (08-19-24 @ 15:30)  HR: 86 (08-20-24 @ 08:14) (66 - 99)  BP: 102/65 (08-20-24 @ 10:00) (91/49 - 110/66)  RR: 18 (08-20-24 @ 08:14) (12 - 18)  SpO2: 100% (08-20-24 @ 08:14) (98% - 100%)  Wt(kg): --I&O's Summary    19 Aug 2024 07:01  -  20 Aug 2024 07:00  --------------------------------------------------------  IN: 1000 mL / OUT: 303 mL / NET: 697 mL        Lying in bed in no apparent distress  Unlabored respirations    EXT:   JARRED Holding suction  Dressing clean, dry and intact.   Compression stocking in place.   Sensation is intact in the superficial peroneal, deep peroneal, tibial, sural and saphenous nerve distributions  Able to dorsiflex and plantarflex ankle. Wiggles toes  Foot warm and well perfused  Sequential compression device on            Labs:                        11.3   13.98 )-----------( 207      ( 20 Aug 2024 06:00 )             33.5    08-20    143  |  108  |  8   ----------------------------<  98  3.9   |  29  |  0.78    Ca    8.3<L>      20 Aug 2024 06:00        Assessment/ Plan: VIK VEE is postoperative day #1 s/p left total hip replacement. Overall doing well  - please ensure ice over operative site while in bed or chair.   -anterior hip precautions  - PT/ OT for rehabilitation. WBAT with ROM activities as tolerated  - multimodal pain regimen.   - IV surgical prophylactic antibiotics x 24 hours.   - chemical DVT prophylaxis per protocol  - SCDs while in bed. Out of bed to chair   - thigh high compression stockings. Keep on operative leg until postoperative appointment. Can remove compression stocking to shower/ bathe.  - incentive spirometry  - disposition planning. Call [phone] to confirm or change postoperative appointment.  - page orthopedics team with questions or concerns.
VIK VEE [id number]    Pt is a 23y year old Male s/p left THR. Pain is 2/10. Mild nausea without vomitting.  Tolerating regular diet, (+) voids.  Denies chest pain/shortness of breath/vomitting.     Vital Signs Last 24 Hrs  T(C): 36.5 (20 Aug 2024 03:16), Max: 36.7 (19 Aug 2024 15:30)  T(F): 97.7 (20 Aug 2024 03:16), Max: 98 (19 Aug 2024 15:30)  HR: 82 (20 Aug 2024 03:16) (66 - 99)  BP: 91/53 (20 Aug 2024 03:16) (91/49 - 133/85)  BP(mean): --  RR: 16 (20 Aug 2024 03:16) (12 - 17)  SpO2: 100% (20 Aug 2024 03:16) (98% - 100%)    Parameters below as of 20 Aug 2024 03:16  Patient On (Oxygen Delivery Method): room air          08-19 @ 07:01  -  08-20 @ 07:00  --------------------------------------------------------  IN: 1000 mL / OUT: 303 mL / NET: 697 mL                              11.3   13.98 )-----------( 207      ( 20 Aug 2024 06:00 )             33.5     08-20    143  |  108  |  8   ----------------------------<  98  3.9   |  29  |  0.78    Ca    8.3<L>      20 Aug 2024 06:00          PE:   LLE: JARRED dressing CDI, Sensation intact to light touch distally, (+2) DP pulses, EHL/FHL/TA intact, Capillary refill < 2 seconds. Negative calf tenderness. PAS on.     A: 23y year old Male s/p left THR POD#1    Plan:   -DVT ppx = PAS +  aspirin enteric coated 81 milliGRAM(s) Oral every 12 hours    -PT/OT = OOB  -Anterior Hip dislocation precautions  -Zofran for nausea, maintain fluids  -Pain control adequate on current regimen  -Medicine to follow   -Continue to Follow Labs  -Incentive spirometry  -Pain for discharge to home today
Discharge medication calendar:  ASA EC 81mg q12h x 4 weeks  Omeprazole 20mg QAM x 4 weeks  Celecoxib 200mg q12h x 2-3 weeks  APAP 1000mg q8h x 2-3 weeks  Narcotic PRN  Docusate 100mg TID while taking narcotic  Miralax, Senna, or Bisacodyl PRN for treatment of constipation  
Subjective: Patient seen and examined. No overnight events. Did well with PT and pain controlled. Good Spirits.     MEDICATIONS  (STANDING):  acetaminophen     Tablet .. 1000 milliGRAM(s) Oral every 8 hours  aspirin enteric coated 81 milliGRAM(s) Oral every 12 hours  celecoxib 200 milliGRAM(s) Oral every 12 hours  lactated ringers. 1000 milliLiter(s) (100 mL/Hr) IV Continuous <Continuous>  pantoprazole    Tablet 40 milliGRAM(s) Oral before breakfast  polyethylene glycol 3350 17 Gram(s) Oral at bedtime  senna 2 Tablet(s) Oral at bedtime    MEDICATIONS  (PRN):  HYDROmorphone  Injectable 0.5 milliGRAM(s) IV Push every 3 hours PRN Breakthrough pain  magnesium hydroxide Suspension 30 milliLiter(s) Oral daily PRN Constipation  ondansetron Injectable 4 milliGRAM(s) IV Push every 6 hours PRN Nausea and/or Vomiting  oxyCODONE    IR 5 milliGRAM(s) Oral every 3 hours PRN Moderate Pain (4 - 6)  oxyCODONE    IR 10 milliGRAM(s) Oral every 3 hours PRN Severe Pain (7 - 10)      Vital Signs Last 24 Hrs  T(C): 36.4 (20 Aug 2024 08:14), Max: 36.7 (19 Aug 2024 15:30)  T(F): 97.6 (20 Aug 2024 08:14), Max: 98 (19 Aug 2024 15:30)  HR: 86 (20 Aug 2024 08:14) (66 - 99)  BP: 102/65 (20 Aug 2024 10:00) (91/49 - 110/66)  BP(mean): --  RR: 18 (20 Aug 2024 08:14) (12 - 18)  SpO2: 100% (20 Aug 2024 08:14) (98% - 100%)    Parameters below as of 20 Aug 2024 03:16  Patient On (Oxygen Delivery Method): room air        PHYSICAL EXAM:  GENERAL: NAD  HEAD:  Atraumatic, Normocephalic  ENMT: Moist mucous membranes  NECK: Supple, No JVD, Normal thyroid  CHEST/LUNG: Clear to auscultation bilaterally  HEART: Regular rate and rhythm  ABDOMEN: Soft, Nontender, Nondistended  EXTREMITIES:  2+ Peripheral Pulses      LABS:                        11.3   13.98 )-----------( 207      ( 20 Aug 2024 06:00 )             33.5     20 Aug 2024 06:00    143    |  108    |  8      ----------------------------<  98     3.9     |  29     |  0.78     Ca    8.3        20 Aug 2024 06:00        Urinalysis Basic - ( 20 Aug 2024 06:00 )    Color: x / Appearance: x / SG: x / pH: x  Gluc: 98 mg/dL / Ketone: x  / Bili: x / Urobili: x   Blood: x / Protein: x / Nitrite: x   Leuk Esterase: x / RBC: x / WBC x   Sq Epi: x / Non Sq Epi: x / Bacteria: x      CAPILLARY BLOOD GLUCOSE

## 2024-08-20 NOTE — DISCHARGE NOTE NURSING/CASE MANAGEMENT/SOCIAL WORK - NSSCNAMETXT_GEN_ALL_CORE
[facility] care agency [phone] will reach out to you within 24-72 hours of your discharge to schedule home care visit/eval appointment with you. Please call agency for any queries regarding home care services

## 2024-08-20 NOTE — DISCHARGE NOTE PROVIDER - NSDCFUADDINST_GEN_ALL_CORE_FT
For Constipation :   • Increase your water intake. Drink at least 8 glasses of water daily.  • Try adding fiber to your diet by eating fruits, vegetables and foods that are rich in grains.  • If you do experience constipation, you may take an over-the-counter stool softener/laxative such as Katerin Colace, Senekot or  Milk of Magnesia.

## 2024-08-20 NOTE — DISCHARGE NOTE PROVIDER - HOSPITAL COURSE
The patient is a 23 y.o. male with severe arthritis   of the left hip.  After admission on August 19, 2024 and receiving pre-operative parenteral prophylactic antibiotics, the patient  underwent an  uncomplicated left anterior total hip replacement by orthopedic surgeon Dr. Otis Correa.    A medical consultation from the Hospitalist service was obtained for post-operative medical co-management. Typical Physical & occupational therapy modalities post total hip replacement were performed including ambulation training, range of motion, ADL's, and transfers. Ecotrin 81 mg q 12 hours was given for DVT prophylaxis.  The patient had a clean appearing surgical dressing with no sign of surgical site infections and had a stable neuro / vascular exam of the operated extremity.  After progression of mobility guided by the PT/ OT staff,  the patient was felt to benefit from further rehabilitative care to increase their level of function. This was felt to best be accomplished in a home setting.  Discharge and Orthopedic Care instructions were delineated in the Discharge Plan and reviewed with the patient. All medications were delineated in the medication reconciliation tool and key points were reviewed with the patient. The patient was deemed stable from an Orthopedic & medical standpoint for discharge today.  They will be  following up with Dr. Correa for office  follow up Orthopedic care at 2 weeks postop or before if necessary.

## 2024-08-20 NOTE — DISCHARGE NOTE NURSING/CASE MANAGEMENT/SOCIAL WORK - NSDCPEFALRISK_GEN_ALL_CORE
For information on Fall & Injury Prevention, visit: [email or web address] OR  [email or web address] OR  https://www.cdc.gov/steadi/patient.html

## 2024-08-20 NOTE — DISCHARGE NOTE PROVIDER - NSDCMRMEDTOKEN_GEN_ALL_CORE_FT
acetaminophen 500 mg oral tablet: 2 tab(s) orally every 8 hours  aspirin 81 mg oral delayed release tablet: 1 tab(s) orally every 12 hours 1 tab by mouth every 12 hours for a total of 28 days MDD: 2  CeleBREX 200 mg oral capsule: 1 cap(s) orally 2 times a day 1 capsule by mouth 2 times daily. To be taken 2 hours after aspirin (ecotrin.) MDD: 2  omeprazole 20 mg oral delayed release capsule: 1 cap(s) orally once a day  oxyCODONE 5 mg oral tablet: 1 tab(s) orally every 4 hours as needed for  moderate pain 1 tab as needed for moderate to severe postop pain up to every 4 hours. [facility]#[id number] MDD: 6  polyethylene glycol 3350 oral powder for reconstitution: 17 gram(s) orally once a day (at bedtime)  senna leaf extract oral tablet: 2 tab(s) orally once a day (at bedtime)

## 2024-08-20 NOTE — CARE COORDINATION ASSESSMENT. - NSDCPLANSERVICES_GEN_ALL_CORE
CM met with patient at bedside.  Patient. A&O x 4. Pt s/p  PROCEDURES: Total left anterior hip replacement.   Pt  resting comfortably / Pt has no complaints at this time.  CM explained role of CM and availability to assist with discharge planning throughout stay.   Provided CM contact information and pt. verbalized understanding. Patient lives in a private house with his parents, 4 steps with HR to navigate. Prior to surgery patient was ind in adls. Patient identified his parents  as his caregivers at home who will assist him during his recuperation and will transport him home and to MD appointments.   Pt. is agreeable with PT/OT's recommendations for d/c plan to home with HC/PT.  CM explained home care expectations, process, insurance provisions and home safety with good understanding.   Offered list of [facility] and pt. chose [facility] at home care agency.  Provided discharge resources folder. CM made referral accordingly.  Informed them of Anticipated  DC date for today 8/20/2024  and for SOC tomorrow 8/21/2024.    Noted pt has a JARRED drsg in place/  Educational flyer provided and reviewed with the patient.  Pt verbalizing understanding and in agreement with d/c plans.  DME: Pt has a RW, commode at home.  PCP: DR Armando Dean [phone]  Pharm: [facility] [phone]/Home Care

## 2024-08-20 NOTE — DISCHARGE NOTE PROVIDER - NSDCCPTREATMENT_GEN_ALL_CORE_FT
PRINCIPAL PROCEDURE  Procedure: Total replacement of left hip joint by anterior approach  Findings and Treatment: Your new total hip replacement requires proper care.  Your surgical care provider is your best resource for information.  Physical Therapy/Occupational Therapy for: Ambulation, transfers, stairs, & ADLs, isometrics.  Full weight bearing on both legs; Walker/cane use as instructed by Physical therapy/Occupational therapy.  Anterior Total Hip Replacement precautions for  6 weeks:  - No straight leg raise;  - No external rotation of hip when extended-standing or lying flat; No hyperextension of hip when standing (kickback).  - Use pain medication if needed as prescribed.  Ice packs are a helpful addition to your comfort.  Applying a  waterproof ice pack over a cloth or thin towel to the site for 30 minutes per hour may provide benefit by reducing swelling and discomfort.  - Keep dressing clean.  - The JARRED (suction) dressing can remain for 7 days. It may be showered lightly after discontcting the tubing.    -Take short, frequent walks increasing the distance that you walk each day as tolerated.  - Change your position every hour to decrease pain and stiffness.  - Continue the exercises taught to you by your physical therapist.  - No driving until cleared by the doctor.  - No tub baths, hot tubs, or swimming pools until instructed by your doctor.  Tape on/near after Post Op Day # 14 in your Surgeon's office.

## 2024-08-20 NOTE — DISCHARGE NOTE PROVIDER - NSDCFUSCHEDAPPT_GEN_ALL_CORE_FT
Otis Correa  [facility].  Scheduled Appointment: 08/25/2024    Otis Correa  [facility]  ORTHOSUR[address]  Scheduled Appointment: 09/10/2024

## 2024-08-20 NOTE — DISCHARGE NOTE PROVIDER - NSDCCPCAREPLAN_GEN_ALL_CORE_FT
PRINCIPAL DISCHARGE DIAGNOSIS  Diagnosis: DJD (degenerative joint disease) of pelvis  Assessment and Plan of Treatment:

## 2024-08-20 NOTE — CARE COORDINATION ASSESSMENT. - NSCAREPROVIDERS_GEN_ALL_CORE_FT
CARE PROVIDERS:  Administration: Itzel Thorpe  Admitting: Otis Correa  Attending: Otis Correa  Covering Team: Tres Bryant  Infection Control: Sena Kong  Nurse: Brandi Corrales  Nurse: Rufina Martinez  Occupational Therapy: Tameka Clifford  Occupational Therapy: Gianluca Whitaker  Override: Catracho Marques  PCA/Nursing Assistant: Liliya Fernandez  Physical Therapy: Farrukh Daniel  Primary Team: Mady Fontaine  Primary Team: Chava Bettencourt  Primary Team: Jose Rosales  Primary Team: Seymour Garcia  Primary Team: Krystyna Tinoco  Primary Team: Diony Bertrand  Primary Team: Thad Winslow  Registered Dietitian: Aldair France  : Latonya Watts  Team: [facility], Team  UR// Supp. Assoc.: Mala Hodges

## 2024-08-20 NOTE — PROGRESS NOTE ADULT - ASSESSMENT
23 year-old male with Ankylosing Spondylitis and related Arthritis presents  with a complaint of left hip pain, s/p total left hip replacement day 0    S/p left total hip replacement day 1  pt/ot  pain control  dvt prophalxsis  Patient medically optimized for discharge if cleared by PT and Ortho.      time spent 35 minutes

## 2024-08-26 ENCOUNTER — NON-APPOINTMENT (OUTPATIENT)
Age: 24
End: 2024-08-26

## 2024-09-09 ENCOUNTER — NON-APPOINTMENT (OUTPATIENT)
Age: 24
End: 2024-09-09

## 2024-09-10 ENCOUNTER — APPOINTMENT (OUTPATIENT)
Dept: ORTHOPEDIC SURGERY | Facility: CLINIC | Age: 24
End: 2024-09-10

## 2024-09-10 VITALS — SYSTOLIC BLOOD PRESSURE: 109 MMHG | HEART RATE: 74 BPM | DIASTOLIC BLOOD PRESSURE: 72 MMHG

## 2024-09-10 DIAGNOSIS — Z96.642 PRESENCE OF LEFT ARTIFICIAL HIP JOINT: ICD-10-CM

## 2024-09-10 PROCEDURE — 99024 POSTOP FOLLOW-UP VISIT: CPT

## 2024-09-10 PROCEDURE — 73502 X-RAY EXAM HIP UNI 2-3 VIEWS: CPT

## 2024-09-24 ENCOUNTER — NON-APPOINTMENT (OUTPATIENT)
Age: 24
End: 2024-09-24

## 2024-11-02 ENCOUNTER — NON-APPOINTMENT (OUTPATIENT)
Age: 24
End: 2024-11-02

## 2025-05-09 NOTE — OCCUPATIONAL THERAPY INITIAL EVALUATION ADULT - ADL RETRAINING, OT EVAL
Detail Level: Detailed Quality 130: Documentation Of Current Medications In The Medical Record: Current Medications Documented Quality 226: Preventive Care And Screening: Tobacco Use: Screening And Cessation Intervention: Patient screened for tobacco use and is an ex/non-smoker Quality 431: Preventive Care And Screening: Unhealthy Alcohol Use - Screening: Patient not identified as an unhealthy alcohol user when screened for unhealthy alcohol use using a systematic screening method Goal: Pt will complete LB dressing with modified independence in 1-2 sessions.

## 2025-06-13 NOTE — H&P PST ADULT - NSANTHOSAYNRD_GEN_A_CORE
Blood pressure mildly elevated, although patient admits that she was not on antihypertensive  Given current blood pressure will restart antihypertensive at lower dose.  Restart valsartan 80 mg daily  Recommend low-salt diet  Follow-up 1 month      Orders:    Comprehensive metabolic panel; Future    TSH, 3rd generation with Free T4 reflex; Future    CBC and differential; Future    valsartan (DIOVAN) 80 mg tablet; Take 1 tablet (80 mg total) by mouth daily     No. DAVIAN screening performed.  STOP BANG Legend: 0-2 = LOW Risk; 3-4 = INTERMEDIATE Risk; 5-8 = HIGH Risk

## 2025-08-28 ENCOUNTER — NON-APPOINTMENT (OUTPATIENT)
Age: 25
End: 2025-08-28

## (undated) DEVICE — SUT PDS II 1 27" CT-1

## (undated) DEVICE — POSITIONER FOAM ABDUCTION PILLOW MED (PINK)

## (undated) DEVICE — CANISTER SUCTION LID GUARD 3000CC

## (undated) DEVICE — DRAPE MAYO STAND 30"

## (undated) DEVICE — POSITIONER FOAM HEAD CRADLE (PINK)

## (undated) DEVICE — VENODYNE/SCD SLEEVE CALF MEDIUM

## (undated) DEVICE — DRAPE UTILITY W TAPE 15X26"

## (undated) DEVICE — DRAPE TOP SHEET 53" X 101"

## (undated) DEVICE — POSITIONER HANA PATIENT CARE KIT POSITION SUPINE

## (undated) DEVICE — POSITIONER STRAP ARMBOARD VELCRO TS-30

## (undated) DEVICE — SOL IRR POUR NS 0.9% 500ML

## (undated) DEVICE — DRAPE XL SHEET 77X98"

## (undated) DEVICE — GRIPPER MEDENVISION

## (undated) DEVICE — ELCTR PENCIL SMOKE EVACUATOR COATED PUSH BUTTON 70MM

## (undated) DEVICE — SPECIMEN CONTAINER PET

## (undated) DEVICE — POSITIONER PATIENT SAFETY STRAP 3X60"

## (undated) DEVICE — DRAPE C ARM 41X140"

## (undated) DEVICE — HOOD FLYTE STRYKER HELMET SHIELD

## (undated) DEVICE — SUT PDS II 0 36" CT-1

## (undated) DEVICE — GLV 8 PROTEXIS (WHITE)

## (undated) DEVICE — NDL HYPO SAFE 18G X 1.5" (PINK)

## (undated) DEVICE — SPECIMEN CONTAINER 4OZ

## (undated) DEVICE — SOLIDIFIER CANN EXPRESS 3K

## (undated) DEVICE — DRSG DERMABOND 0.7ML

## (undated) DEVICE — SOL IRR POUR H2O 1500ML

## (undated) DEVICE — SOL IRR BAG NS 0.9% 3000ML

## (undated) DEVICE — DRAPE SHOWER CURTAIN ISOLATION

## (undated) DEVICE — LINER TRACTION BOOT MIZUHO

## (undated) DEVICE — SUT QUILL PDO 1 30CM T9 DA

## (undated) DEVICE — POSITIONER POSITIONING ROLL  5X17"

## (undated) DEVICE — HANDPIECE INTERPULSE W MULTI TIP

## (undated) DEVICE — WARMING BLANKET UPPER ADULT

## (undated) DEVICE — GLV 7.5 PROTEXIS (WHITE)

## (undated) DEVICE — SAW BLADE STRYKER SAGITTAL 21.0X90X1.27MM

## (undated) DEVICE — SYR ASEPTO

## (undated) DEVICE — SUT VICRYL PLUS 1 27" CP UNDYED

## (undated) DEVICE — DRSG SILVERLON ISLAND 4X10" 2X8" PAD

## (undated) DEVICE — STAPLER SKIN VISI-STAT 35 WIDE

## (undated) DEVICE — BAG SPONGE COUNTER EZ

## (undated) DEVICE — DRSG STERISTRIPS 0.5 X 4"

## (undated) DEVICE — DRSG PICO NPWT 4X8"

## (undated) DEVICE — SUT MONOCRYL 2-0 36" CT-1 UNDYED

## (undated) DEVICE — POSITIONER STIRRUP STRAP W SLIP RING 19X3.5"

## (undated) DEVICE — SYR LUER LOK 50CC

## (undated) DEVICE — DRSG SILVERLON ISLAND 4X6" 2X4" PAD

## (undated) DEVICE — ELCTR AQUAMANTYS BIPOLAR SEALER 6.0

## (undated) DEVICE — PACK TOTAL HIP